# Patient Record
Sex: MALE | Race: BLACK OR AFRICAN AMERICAN | NOT HISPANIC OR LATINO | Employment: FULL TIME | URBAN - METROPOLITAN AREA
[De-identification: names, ages, dates, MRNs, and addresses within clinical notes are randomized per-mention and may not be internally consistent; named-entity substitution may affect disease eponyms.]

---

## 2020-10-05 ENCOUNTER — TELEPHONE (OUTPATIENT)
Dept: OPHTHALMOLOGY | Facility: CLINIC | Age: 25
End: 2020-10-05

## 2020-10-06 ENCOUNTER — HOSPITAL ENCOUNTER (EMERGENCY)
Facility: HOSPITAL | Age: 25
Discharge: HOME | End: 2020-10-06
Attending: EMERGENCY MEDICINE
Payer: COMMERCIAL

## 2020-10-06 VITALS
HEIGHT: 74 IN | TEMPERATURE: 98 F | OXYGEN SATURATION: 99 % | BODY MASS INDEX: 29.13 KG/M2 | HEART RATE: 69 BPM | DIASTOLIC BLOOD PRESSURE: 66 MMHG | WEIGHT: 227 LBS | RESPIRATION RATE: 16 BRPM | SYSTOLIC BLOOD PRESSURE: 135 MMHG

## 2020-10-06 DIAGNOSIS — H47.10 PAPILLEDEMA: ICD-10-CM

## 2020-10-06 DIAGNOSIS — H53.8 BLURRY VISION, BILATERAL: Primary | ICD-10-CM

## 2020-10-06 LAB
BUN SERPL-MCNC: 10 MG/DL (ref 6–30)
CHLORIDE SERPL-SCNC: 101 MMOL/L (ref 95–110)
CREAT SERPL-MCNC: 1.1 MG/DL (ref 0.5–1.4)
GLUCOSE SERPL-MCNC: 90 MG/DL (ref 70–110)
HCT VFR BLD CALC: 48 %PCV (ref 36–54)
POC IONIZED CALCIUM: 1.18 MMOL/L (ref 1.06–1.42)
POC TCO2 (MEASURED): 28 MMOL/L (ref 23–29)
POTASSIUM BLD-SCNC: 4.7 MMOL/L (ref 3.5–5.1)
SAMPLE: NORMAL
SODIUM BLD-SCNC: 139 MMOL/L (ref 136–145)

## 2020-10-06 PROCEDURE — 25500020 PHARM REV CODE 255: Performed by: EMERGENCY MEDICINE

## 2020-10-06 PROCEDURE — 99285 EMERGENCY DEPT VISIT HI MDM: CPT | Mod: 25

## 2020-10-06 PROCEDURE — 99284 PR EMERGENCY DEPT VISIT,LEVEL IV: ICD-10-PCS | Mod: ,,, | Performed by: EMERGENCY MEDICINE

## 2020-10-06 PROCEDURE — 80047 BASIC METABLC PNL IONIZED CA: CPT

## 2020-10-06 PROCEDURE — A9585 GADOBUTROL INJECTION: HCPCS | Performed by: EMERGENCY MEDICINE

## 2020-10-06 PROCEDURE — 25000003 PHARM REV CODE 250: Performed by: STUDENT IN AN ORGANIZED HEALTH CARE EDUCATION/TRAINING PROGRAM

## 2020-10-06 PROCEDURE — 99284 EMERGENCY DEPT VISIT MOD MDM: CPT | Mod: ,,, | Performed by: EMERGENCY MEDICINE

## 2020-10-06 RX ORDER — GADOBUTROL 604.72 MG/ML
10 INJECTION INTRAVENOUS
Status: COMPLETED | OUTPATIENT
Start: 2020-10-06 | End: 2020-10-06

## 2020-10-06 RX ORDER — TETRACAINE HYDROCHLORIDE 5 MG/ML
2 SOLUTION OPHTHALMIC
Status: COMPLETED | OUTPATIENT
Start: 2020-10-06 | End: 2020-10-06

## 2020-10-06 RX ADMIN — GADOBUTROL 10 ML: 604.72 INJECTION INTRAVENOUS at 12:10

## 2020-10-06 RX ADMIN — TETRACAINE HYDROCHLORIDE 2 DROP: 5 SOLUTION OPHTHALMIC at 10:10
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
## 2020-10-06 NOTE — ED NOTES
I-STAT Chem-8+ Results:   Value Reference Range   Sodium 139 136-145 mmol/L   Potassium  4.7 3.5-5.1 mmol/L   Chloride 101  mmol/L   Ionized Calcium 1.18 1.06-1.42 mmol/L   CO2 (measured) 28 23-29 mmol/L   Glucose 90  mg/dL   BUN 10 6-30 mg/dL   Creatinine 1.1 0.5-1.4 mg/dL   Hematocrit 48 36-54%

## 2025-03-19 ENCOUNTER — TELEPHONE (OUTPATIENT)
Dept: INTERNAL MEDICINE | Facility: CLINIC | Age: 30
End: 2025-03-19
Payer: COMMERCIAL

## 2025-03-19 NOTE — TELEPHONE ENCOUNTER
----- Message from Sarah sent at 3/18/2025 11:28 AM CDT -----  .1MEDICALADVICE Patient is calling for Medical Advice regarding: pt would like to est care. Tyesha esquivel wants to be a new patient please call back at . How long has patient had these symptoms:Pharmacy name and phone#:[facility] back [facility]:Comments:Please advise patient replies from provider may take up to 48 hours.